# Patient Record
Sex: FEMALE | Race: WHITE
[De-identification: names, ages, dates, MRNs, and addresses within clinical notes are randomized per-mention and may not be internally consistent; named-entity substitution may affect disease eponyms.]

---

## 2021-12-22 ENCOUNTER — HOSPITAL ENCOUNTER (EMERGENCY)
Dept: HOSPITAL 49 - FER | Age: 21
Discharge: HOME | End: 2021-12-22
Payer: COMMERCIAL

## 2021-12-22 DIAGNOSIS — R10.31: Primary | ICD-10-CM

## 2021-12-22 DIAGNOSIS — Z53.29: ICD-10-CM

## 2021-12-22 LAB
ALBUMIN SERPL-MCNC: 4.1 G/DL (ref 3.4–5)
ALKALINE PHOSHATASE: 63 U/L (ref 46–116)
ALT SERPL-CCNC: 22 U/L (ref 14–59)
AST: 16 U/L (ref 15–37)
BASOPHIL: 0.7 % (ref 0–2)
BILIRUBIN - TOTAL: 0.8 MG/DL (ref 0.2–1)
BILIRUBIN: NEGATIVE MG/DL
BLOOD: NEGATIVE ERY/UL
BUN SERPL-MCNC: 13 MG/DL (ref 7–18)
BUN/CREAT RATIO (CALC): 12.4 RATIO
CHLORIDE: 103 MMOL/L (ref 98–107)
CLARITY UR: CLEAR
CO2 (BICARBONATE): 29 MMOL/L (ref 21–32)
COLOR: YELLOW
CREATININE: 1.05 MG/DL (ref 0.51–0.95)
EOSINOPHIL: 1.3 % (ref 0–5)
GLOBULIN (CALCULATION): 3.1 G/DL
GLUCOSE (U): NORMAL MG/DL
GLUCOSE SERPL-MCNC: 88 MG/DL (ref 74–106)
HCT: 40.7 % (ref 37–47)
HGB BLD-MCNC: 13.5 G/DL (ref 12.5–16)
LEUKOCYTES: NEGATIVE LEU/UL
LYMPHOCYTE: 37.4 % (ref 15–48)
MCH RBC QN AUTO: 29.8 PG (ref 25–31)
MCHC RBC AUTO-ENTMCNC: 33.2 G/DL (ref 32–36)
MCV: 89.8 FL (ref 78–100)
MONOCYTE: 9.5 % (ref 0–12)
MPV: 10.8 FL (ref 6–9.5)
NEUTROPHIL: 50.9 % (ref 41–80)
NITRITE: NEGATIVE MG/DL
NRBC: 0
PLT: 258 K/UL (ref 150–400)
POTASSIUM: 4 MMOL/L (ref 3.5–5.1)
PROTEIN: NEGATIVE MG/DL
RBC MORPHOLOGY: NORMAL
RBC: 4.53 M/UL (ref 4.2–5.4)
RDW: 13.3 % (ref 11.5–14)
SPECIFIC GRAVITY: 1.02 (ref 1–1.03)
TOTAL PROTEIN: 7.2 G/DL (ref 6.4–8.2)
URINARY RBC: (no result)
URINARY WBC: (no result)
UROBILINOGEN: 0.2 MG/DL (ref 0.2–1)
WBC: 6.1 K/UL (ref 4–10.5)

## 2022-10-23 ENCOUNTER — HOSPITAL ENCOUNTER (EMERGENCY)
Facility: HOSPITAL | Age: 22
Discharge: HOME | End: 2022-10-24
Attending: EMERGENCY MEDICINE | Admitting: EMERGENCY MEDICINE

## 2022-10-23 VITALS
DIASTOLIC BLOOD PRESSURE: 55 MMHG | WEIGHT: 138.45 LBS | SYSTOLIC BLOOD PRESSURE: 128 MMHG | HEIGHT: 66 IN | BODY MASS INDEX: 22.25 KG/M2 | HEART RATE: 88 BPM | OXYGEN SATURATION: 100 % | TEMPERATURE: 98.2 F | RESPIRATION RATE: 16 BRPM

## 2022-10-23 DIAGNOSIS — R09.82 POST-NASAL DRAINAGE: Primary | ICD-10-CM

## 2022-10-23 LAB — S PYO AG THROAT QL: NEGATIVE

## 2022-10-23 PROCEDURE — 87081 CULTURE SCREEN ONLY: CPT | Performed by: EMERGENCY MEDICINE

## 2022-10-23 PROCEDURE — 99283 EMERGENCY DEPT VISIT LOW MDM: CPT

## 2022-10-23 PROCEDURE — 87880 STREP A ASSAY W/OPTIC: CPT | Performed by: EMERGENCY MEDICINE

## 2022-10-24 NOTE — ED PROVIDER NOTES
"Time: 9:53 PM EDT  Chief Complaint:   Chief Complaint   Patient presents with   • Hoarse     Denies sore throat           History of Present Illness:  Patient is a 22 y.o. year old female who presents to the emergency department with c/o  Hoarseness to voice that started today.  No fever. Had recurrent strep in childhood. Has congestion and post nasal drainage in the morning.  States not having a sore throat with this.  Denies sinus pain or pressure, fever, headache, chills body aches, nausea, vomiting, diarrhea.  Not taking any medication for symptoms.  Denies dental pain, facial swelling.            History provided by:  Patient   used: No            Patient Care Team  Primary Care Provider: Provider, No Known    Past Medical History:     No Known Allergies  No past medical history on file.  No past surgical history on file.  No family history on file.    Home Medications:  Prior to Admission medications    Not on File        Social History:          Review of Systems:  Review of Systems   Constitutional: Negative for chills and fever.   HENT: Positive for voice change. Negative for congestion and sore throat.    Respiratory: Negative for cough and shortness of breath.    Cardiovascular: Negative for chest pain and palpitations.   Gastrointestinal: Negative for abdominal pain, diarrhea, nausea and vomiting.   Genitourinary: Negative for dysuria.   Neurological: Negative for headaches.   All other systems reviewed and are negative.       Physical Exam:  /55 (BP Location: Left arm, Patient Position: Sitting)   Pulse 88   Temp 98.2 °F (36.8 °C) (Oral)   Resp 16   Ht 167.6 cm (66\")   Wt 62.8 kg (138 lb 7.2 oz)   SpO2 100%   BMI 22.35 kg/m²     Physical Exam  Vitals and nursing note reviewed.   Constitutional:       Appearance: Normal appearance. She is not ill-appearing or toxic-appearing.   HENT:      Head: Normocephalic.      Nose: Nose normal. No congestion.      Mouth/Throat:      " Mouth: Mucous membranes are moist.      Pharynx: No oropharyngeal exudate or posterior oropharyngeal erythema.   Eyes:      Conjunctiva/sclera: Conjunctivae normal.      Pupils: Pupils are equal, round, and reactive to light.   Cardiovascular:      Rate and Rhythm: Normal rate and regular rhythm.   Pulmonary:      Effort: Pulmonary effort is normal.      Breath sounds: Normal breath sounds.   Abdominal:      General: There is no distension.   Musculoskeletal:         General: Normal range of motion.      Cervical back: Normal range of motion and neck supple.   Skin:     General: Skin is warm and dry.      Capillary Refill: Capillary refill takes less than 2 seconds.   Neurological:      General: No focal deficit present.      Mental Status: She is alert and oriented to person, place, and time.   Psychiatric:         Mood and Affect: Mood normal.         Behavior: Behavior normal.                Medications in the Emergency Department:  Medications - No data to display     Labs  Lab Results (last 24 hours)     Procedure Component Value Units Date/Time    Rapid Strep A Screen - Swab, Throat [[id number]]  (Normal) Collected: 10/23/22 2156    Specimen: Swab from Throat Updated: 10/23/22 2241     Strep A Ag Negative    Beta Strep Culture, Throat - Swab, Throat [[id number]] Collected: 10/23/22 2156    Specimen: Swab from Throat Updated: 10/23/22 2241           Imaging:  No Radiology Exams Resulted Within Past 24 Hours    Procedures:  Procedures    Progress  ED Course as of 10/24/22 0134   Sun Oct 23, 2022   2152 --- PROVIDER IN TRIAGE NOTE ---    The patient was seen and evaluated by farida Bolivar in triage. Orders were placed and the patient is currently awaiting disposition. [KS]      ED Course User Index  [KS] Marely Bolivar APRN                            The patient was initially evaluated in the triage area where orders were placed. The patient was later dispositioned by Inder Rodrigues PA-C.      The patient  was advised to stay for completion of workup which includes but is not limited to communication of labs and radiological results, reassessment and plan. The patient was advised that leaving prior to disposition by a provider could result in critical findings that are not communicated to the patient.     Medical Decision Making:  MDM     Discussed symptoms likely from postnasal drainage.  Discussed using over-the-counter Flonase.  Increase fluid hydration.  Over-the-counter medications as needed.  Do not suspect sepsis, meningitis, Dusty's angina, retropharyngeal abscess. Strep neg, culture pending.    I have spoken with the patient. I have explained the patient´s condition, diagnoses and treatment plan based on the information available to me at this time. I have answered the patient questions and addressed any concerns. The patient has a good  understanding of the patient´s diagnosis, condition, and treatment plan as can be expected at this point. The vital signs have been stable. The patient´s condition is stable and appropriate for discharge from the emergency department.      The patient will pursue further outpatient evaluation with the primary care physician or other designated or consulting physician as outlined in the discharge instructions. They are agreeable to this plan of care and follow-up instructions have been explained in detail. The patient has received these instructions in written format and have expressed an understanding of the discharge instructions. The patient is aware that any significant change in condition or worsening of symptoms should prompt an immediate return to this or the closest emergency department or call to 911.     The following orders were placed after triage and evaluation:  Orders Placed This Encounter   Procedures   • Rapid Strep A Screen - Swab, Throat   • Beta Strep Culture, Throat - Swab, Throat       Final diagnoses:   Post-nasal drainage          Disposition:  ED  Disposition     ED Disposition   Discharge    Condition   Stable    Comment   --             This medical record created using voice recognition software.           Inder Rodrigues PA-C  10/24/22 0138

## 2022-10-25 LAB — BACTERIA SPEC AEROBE CULT: NORMAL
